# Patient Record
Sex: FEMALE | Race: WHITE
[De-identification: names, ages, dates, MRNs, and addresses within clinical notes are randomized per-mention and may not be internally consistent; named-entity substitution may affect disease eponyms.]

---

## 2019-08-05 ENCOUNTER — HOSPITAL ENCOUNTER (EMERGENCY)
Dept: HOSPITAL 10 - E/R | Age: 54
Discharge: LEFT BEFORE DISCHARGE | End: 2019-08-05
Payer: SELF-PAY

## 2019-08-05 ENCOUNTER — HOSPITAL ENCOUNTER (EMERGENCY)
Dept: HOSPITAL 91 - E/R | Age: 54
Discharge: LEFT BEFORE DISCHARGE | End: 2019-08-05
Payer: SELF-PAY

## 2019-08-05 VITALS
HEIGHT: 64 IN | BODY MASS INDEX: 29.08 KG/M2 | HEIGHT: 64 IN | WEIGHT: 170.35 LBS | WEIGHT: 170.35 LBS | BODY MASS INDEX: 29.08 KG/M2

## 2019-08-05 VITALS — SYSTOLIC BLOOD PRESSURE: 105 MMHG | RESPIRATION RATE: 15 BRPM | HEART RATE: 51 BPM | DIASTOLIC BLOOD PRESSURE: 72 MMHG

## 2019-08-05 DIAGNOSIS — R40.2252: ICD-10-CM

## 2019-08-05 DIAGNOSIS — R40.2142: ICD-10-CM

## 2019-08-05 DIAGNOSIS — R07.9: Primary | ICD-10-CM

## 2019-08-05 DIAGNOSIS — R40.2362: ICD-10-CM

## 2019-08-05 DIAGNOSIS — J45.909: ICD-10-CM

## 2019-08-05 LAB
ADD MAN DIFF?: NO
ALANINE AMINOTRANSFERASE: 18 IU/L (ref 13–69)
ALBUMIN: 4.5 G/DL (ref 3.3–4.9)
ALKALINE PHOSPHATASE: 71 IU/L (ref 42–121)
ANION GAP: 9 (ref 5–13)
ASPARTATE AMINO TRANSFERASE: 23 IU/L (ref 15–46)
BASOPHIL #: 0 10^3/UL (ref 0–0.1)
BASOPHILS %: 0.7 % (ref 0–2)
BILIRUBIN,DIRECT: 0 MG/DL (ref 0–0.2)
BILIRUBIN,TOTAL: 0.3 MG/DL (ref 0.2–1.3)
BLOOD UREA NITROGEN: 13 MG/DL (ref 7–20)
CALCIUM: 9.7 MG/DL (ref 8.4–10.2)
CARBON DIOXIDE: 26 MMOL/L (ref 21–31)
CHLORIDE: 108 MMOL/L (ref 97–110)
CREATININE: 0.77 MG/DL (ref 0.44–1)
EOSINOPHILS #: 0.1 10^3/UL (ref 0–0.5)
EOSINOPHILS %: 2.5 % (ref 0–7)
GLUCOSE: 96 MG/DL (ref 70–220)
HEMATOCRIT: 38.3 % (ref 37–47)
HEMOGLOBIN: 12.9 G/DL (ref 12–16)
IMMATURE GRANS #M: 0.01 10^3/UL (ref 0–0.03)
IMMATURE GRANS % (M): 0.2 % (ref 0–0.43)
LIPASE: 56 U/L (ref 23–300)
LYMPHOCYTES #: 2.7 10^3/UL (ref 0.8–2.9)
LYMPHOCYTES %: 47.5 % (ref 15–51)
MEAN CORPUSCULAR HEMOGLOBIN: 30.5 PG (ref 29–33)
MEAN CORPUSCULAR HGB CONC: 33.7 G/DL (ref 32–37)
MEAN CORPUSCULAR VOLUME: 90.5 FL (ref 82–101)
MEAN PLATELET VOLUME: 9.4 FL (ref 7.4–10.4)
MONOCYTE #: 0.4 10^3/UL (ref 0.3–0.9)
MONOCYTES %: 7.3 % (ref 0–11)
NEUTROPHIL #: 2.3 10^3/UL (ref 1.6–7.5)
NEUTROPHILS %: 41.8 % (ref 39–77)
NUCLEATED RED BLOOD CELLS #: 0 10^3/UL (ref 0–0)
NUCLEATED RED BLOOD CELLS%: 0 /100WBC (ref 0–0)
PLATELET COUNT: 208 10^3/UL (ref 140–415)
POTASSIUM: 3.9 MMOL/L (ref 3.5–5.1)
RED BLOOD COUNT: 4.23 10^6/UL (ref 4.2–5.4)
RED CELL DISTRIBUTION WIDTH: 12 % (ref 11.5–14.5)
SODIUM: 143 MMOL/L (ref 135–144)
TOTAL PROTEIN: 7.4 G/DL (ref 6.1–8.1)
TROPONIN-I: < 0.012 NG/ML (ref 0–0.12)
WHITE BLOOD COUNT: 5.6 10^3/UL (ref 4.8–10.8)

## 2019-08-05 PROCEDURE — 99285 EMERGENCY DEPT VISIT HI MDM: CPT

## 2019-08-05 PROCEDURE — 80076 HEPATIC FUNCTION PANEL: CPT

## 2019-08-05 PROCEDURE — 93005 ELECTROCARDIOGRAM TRACING: CPT

## 2019-08-05 PROCEDURE — 80048 BASIC METABOLIC PNL TOTAL CA: CPT

## 2019-08-05 PROCEDURE — 85025 COMPLETE CBC W/AUTO DIFF WBC: CPT

## 2019-08-05 PROCEDURE — 36415 COLL VENOUS BLD VENIPUNCTURE: CPT

## 2019-08-05 PROCEDURE — 71045 X-RAY EXAM CHEST 1 VIEW: CPT

## 2019-08-05 PROCEDURE — 84484 ASSAY OF TROPONIN QUANT: CPT

## 2019-08-05 PROCEDURE — 83690 ASSAY OF LIPASE: CPT

## 2019-08-05 RX ADMIN — ACETAMINOPHEN 1 MG: 325 TABLET, FILM COATED ORAL at 22:30

## 2019-08-05 RX ADMIN — NITROGLYCERIN 1 TAB: 0.4 TABLET, ORALLY DISINTEGRATING SUBLINGUAL at 21:17

## 2019-08-05 RX ADMIN — NITROGLYCERIN 1 INCH: 20 OINTMENT TOPICAL at 21:17

## 2019-08-05 RX ADMIN — NITROGLYCERIN 1 TAB: 0.4 TABLET, ORALLY DISINTEGRATING SUBLINGUAL at 21:22

## 2019-08-05 RX ADMIN — ASPIRIN 81 MG CHEWABLE TABLET 1 MG: 81 TABLET CHEWABLE at 21:17

## 2019-08-05 RX ADMIN — NITROGLYCERIN PRN TAB: 0.4 TABLET SUBLINGUAL at 21:17

## 2019-08-05 RX ADMIN — NITROGLYCERIN PRN TAB: 0.4 TABLET SUBLINGUAL at 21:22

## 2019-08-05 NOTE — ERD
ER Documentation


Chief Complaint


Chief Complaint





CP, SOB, N/V STARTED AFTER CLEANING DUST; STATES HX OF ASTHMA





HPI


Patient is a 54-year-old female with no medical problems who presents with chest


pain shortness of breath.  The symptoms started this morning but were worse with


walking.  The patient has had vomiting as well.  She felt like her left upper 


extremity, face, and ear were numb.  She said the symptoms come and go.  She t


ook 81 mg aspirin today.  Upon review of old medical records this is the 


patient's first visit to the emergency department.  The patient does not 


currently have a primary doctor.





ROS


All systems reviewed and are negative except as per history of present illness.





PMhx/Soc


History of Surgery:  Yes (c section x3, appendectomy, tonsillectomy )


Anesthesia Reaction:  No


Hx Neurological Disorder:  No


Hx Respiratory Disorders:  No


Hx Cardiac Disorders:  No


Hx Psychiatric Problems:  No


Hx Miscellaneous Medical Probl:  No


Hx Alcohol Use:  No


Hx Substance Use:  No


Hx Tobacco Use:  No


Smoking Status:  Never smoker





FmHx


Family History:  No coronary disease





Physical Exam


Vitals





Vital Signs


  Date      Temp  Pulse  Resp  B/P (MAP)   Pulse Ox  O2          O2 Flow    FiO2


Time                                                 Delivery    Rate


    8/5/19           51    15      105/72       100  Room Air


     22:22                           (83)


    8/5/19           59    21      119/75        96  Room Air


     21:15                           (90)


    8/5/19  98.9     70    19      116/59        99


     18:58                           (78)





Physical Exam


Const:   No acute distress


Head:   Atraumatic 


Eyes:    Normal Conjunctiva


ENT:    Normal External Ears, Nose and Mouth.


Neck:               Full range of motion. No meningismus.


Resp:   Clear to auscultation bilaterally


Cardio:   Regular rate and rhythm, no murmurs


Abd:    Soft, non tender, non distended. Normal bowel sounds


Skin:   No petechiae or rashes


Back:   No midline or flank tenderness


Ext:    No cyanosis, or edema


Neur:   Awake and alert


Psych:    Normal Mood and Affect


Result Diagram:  


8/5/19 2112 8/5/19 2112





Results 24 hrs





Laboratory Tests


              Test
                                  8/5/19
21:12


              White Blood Count                      5.6 10^3/ul


              Red Blood Count                       4.23 10^6/ul


              Hemoglobin                               12.9 g/dl


              Hematocrit                                  38.3 %


              Mean Corpuscular Volume                    90.5 fl


              Mean Corpuscular Hemoglobin                30.5 pg


              Mean Corpuscular Hemoglobin
Concent     33.7 g/dl 



              Red Cell Distribution Width                 12.0 %


              Platelet Count                         208 10^3/UL


              Mean Platelet Volume                        9.4 fl


              Immature Granulocytes %                    0.200 %


              Neutrophils %                               41.8 %


              Lymphocytes %                               47.5 %


              Monocytes %                                  7.3 %


              Eosinophils %                                2.5 %


              Basophils %                                  0.7 %


              Nucleated Red Blood Cells %            0.0 /100WBC


              Immature Granulocytes #              0.010 10^3/ul


              Neutrophils #                          2.3 10^3/ul


              Lymphocytes #                          2.7 10^3/ul


              Monocytes #                            0.4 10^3/ul


              Eosinophils #                          0.1 10^3/ul


              Basophils #                            0.0 10^3/ul


              Nucleated Red Blood Cells #            0.0 10^3/ul


              Sodium Level                            143 mmol/L


              Potassium Level                         3.9 mmol/L


              Chloride Level                          108 mmol/L


              Carbon Dioxide Level                     26 mmol/L


              Anion Gap                                        9


              Blood Urea Nitrogen                       13 mg/dl


              Creatinine                              0.77 mg/dl


              Est Glomerular Filtrat Rate
mL/min   > 60 mL/min 



              Glucose Level                             96 mg/dl


              Calcium Level                            9.7 mg/dl


              Troponin I                           < 0.012 ng/ml





Current Medications


 Medications
   Dose
          Sig/Esau
       Start Time
   Status  Last


 (Trade)       Ordered        Route
 PRN     Stop Time              Admin
Dose


                              Reason                                Admin


 Aspirin
       162 mg         ONCE  STAT
    8/5/19        DC            8/5/19


(Aspirin)                     PO
            20:48
 8/5/19                21:17



                                             20:49


                1 inch         ONCE  STAT
    8/5/19        DC            8/5/19


Nitroglycerin                 TD
            20:48
 8/5/19                21:17




                                            20:49


(Nitroglyceri


n
 2% Oint)


                1 tab          Q5M UP TO 3    8/5/19 8/5/19


Nitroglycerin                 DOSES PRN
     21:00
                       21:22




                             SL
 .CHEST


(Nitroglyceri                 PAIN


n
  (Sl Tab)


0.4 Mg)


                650 mg         ONCE  ONCE
    8/5/19        DC            8/5/19


Acetaminophen                 PO
            22:00
 8/5/19                22:30




  (Tylenol                                  22:01


Tab)


 Ondansetron    4 mg           [facility]GE      8/5/19                 



HCl
  (Zofran                 PRN
 IV
       22:00
 8/6/19


Inj)                          NAUSEA/VOMITI  21:59


                              NG


                650 mg         [facility] BRIDGE      8/5/19                 



Acetaminophen                 PRN
 PO
       22:00
 8/6/19



  (Tylenol                   .MILD PAIN     21:59


Tab)                          1-3 OR TEMP


 IV Flush
      3 ml           PER            8/5/19        UNV      



(NS 3 ml)                     PROTOCOL
 IV
  22:30



 Ondansetron    4 mg           Q6H  PRN
      8/5/19        UNV      



HCl
  (Zofran                 IV
            22:30



Inj)                          NAUSEA/VOMITI


                              NG


 Aspirin
       81 mg          DAILY
 PO
     8/6/19        UNV      



(Aspirin)                                    09:00



                1 tab          Q5M  PRN
      8/5/19        UNV      



Nitroglycerin                 SL
 .CHEST     22:30




                             PAIN


(Nitroglyceri


n
  (Sl Tab)


0.4 Mg)


                650 mg         Q6H  PRN
      8/5/19        UNV      



Acetaminophen                 PO
 .PAIN 1-3  22:30




  (Tylenol                   OR TEMP


Tab)


 Morphine       2 mg           Q4H  PRN
      8/5/19        UNV      



Sulfate
                      IV
 .PAIN      22:30



(morphine)                    7-10


 Docusate       100 mg         Q12H  PRN
     8/5/19        UNV      



Sodium
                       PO
            22:30



(Colace)                      .CONSTIPATION


 Bisacodyl
     5 mg           DAILY  PRN
    8/5/19        UNV      



(Dulcolax)                    PO
            22:30



                              .CONSTIPATION








Procedures/MDM


EKG 1 read by me: 


Rate/Rhythm: Regular rate and rhythm 


Intervals:    Normal


Impression:     Poor R wave progression





EKG 2 read by me: 


Rate/Rhythm: Regular rate and rhythm 


Intervals:    Normal


Impression:     Poor R wave progression





Chest x-ray read by radiology.





Patient is a 54-year-old female who presents with chest pain and shortness of 


breath.  I am concerned for potential acute coronary syndrome.  I doubt 


pneumonia, pneumothorax, pulmonary embolism, or aortic dissection.  The patient 


was given aspirin and nitroglycerin.  The patient will be admitted to the care 


of Dr. Lopez to a telemetry observation bed.





Departure


Diagnosis:  


   Primary Impression:  


   Chest pain


   Chest pain type:  unspecified  Qualified Codes:  R07.9 - Chest pain, 


   unspecified


Condition:  RAFFY Cheng MD                 Aug 5, 2019 22:36